# Patient Record
Sex: FEMALE | URBAN - METROPOLITAN AREA
[De-identification: names, ages, dates, MRNs, and addresses within clinical notes are randomized per-mention and may not be internally consistent; named-entity substitution may affect disease eponyms.]

---

## 2017-07-27 ENCOUNTER — APPOINTMENT (RX ONLY)
Dept: URBAN - METROPOLITAN AREA CLINIC 116 | Facility: CLINIC | Age: 37
Setting detail: DERMATOLOGY
End: 2017-07-27

## 2017-07-27 DIAGNOSIS — L90.8 OTHER ATROPHIC DISORDERS OF SKIN: ICD-10-CM

## 2017-07-27 PROCEDURE — ? FACIAL

## 2017-07-27 ASSESSMENT — LOCATION SIMPLE DESCRIPTION DERM: LOCATION SIMPLE: LEFT FOREHEAD

## 2017-07-27 ASSESSMENT — LOCATION DETAILED DESCRIPTION DERM: LOCATION DETAILED: LEFT INFERIOR MEDIAL FOREHEAD

## 2017-07-27 ASSESSMENT — LOCATION ZONE DERM: LOCATION ZONE: FACE

## 2017-07-27 NOTE — HPI: COSMETIC CONSULTATION
Additional History: Pt presented for a signature facial used special cleanse and balancing cleanser followed by clarifying brightening polish as exfoliate facial steam extractions facial face ,neck and shoulder massage.  Vik mask was applied followed by equalizing toner and vitamin c 15 % growth  ultra light dew moisturizer and tinted sunscreen follow up 4-6 weeks

## 2017-07-27 NOTE — PROCEDURE: FACIAL
Treatment Type (Optional): European Facial
Mask Type (Optional): vitamin C
Exfoliation Type: glycolic exfoliant
Facial Steaming: steamed
Exfoliation Type Override: clarifying brightening polish
Detail Level: Zone
Extraction Method: extractor